# Patient Record
Sex: MALE | Race: WHITE | URBAN - METROPOLITAN AREA
[De-identification: names, ages, dates, MRNs, and addresses within clinical notes are randomized per-mention and may not be internally consistent; named-entity substitution may affect disease eponyms.]

---

## 2017-11-01 ENCOUNTER — TRANSFERRED RECORDS (OUTPATIENT)
Dept: HEALTH INFORMATION MANAGEMENT | Facility: CLINIC | Age: 67
End: 2017-11-01

## 2019-02-22 ENCOUNTER — OFFICE VISIT (OUTPATIENT)
Dept: FAMILY MEDICINE | Facility: CLINIC | Age: 69
End: 2019-02-22
Payer: COMMERCIAL

## 2019-02-22 ENCOUNTER — ANCILLARY PROCEDURE (OUTPATIENT)
Dept: GENERAL RADIOLOGY | Facility: CLINIC | Age: 69
End: 2019-02-22
Attending: NURSE PRACTITIONER
Payer: COMMERCIAL

## 2019-02-22 VITALS
OXYGEN SATURATION: 96 % | SYSTOLIC BLOOD PRESSURE: 136 MMHG | HEART RATE: 86 BPM | HEIGHT: 77 IN | DIASTOLIC BLOOD PRESSURE: 92 MMHG | BODY MASS INDEX: 32 KG/M2 | WEIGHT: 271 LBS | TEMPERATURE: 97.7 F

## 2019-02-22 DIAGNOSIS — M25.532 LEFT WRIST PAIN: Primary | ICD-10-CM

## 2019-02-22 DIAGNOSIS — M25.532 LEFT WRIST PAIN: ICD-10-CM

## 2019-02-22 DIAGNOSIS — S52.612A CLOSED DISPLACED FRACTURE OF STYLOID PROCESS OF LEFT ULNA, INITIAL ENCOUNTER: ICD-10-CM

## 2019-02-22 DIAGNOSIS — S52.502A CLOSED FRACTURE OF DISTAL END OF LEFT RADIUS, UNSPECIFIED FRACTURE MORPHOLOGY, INITIAL ENCOUNTER: ICD-10-CM

## 2019-02-22 PROCEDURE — 99203 OFFICE O/P NEW LOW 30 MIN: CPT | Mod: 25 | Performed by: NURSE PRACTITIONER

## 2019-02-22 PROCEDURE — 73110 X-RAY EXAM OF WRIST: CPT | Mod: LT

## 2019-02-22 PROCEDURE — 29125 APPL SHORT ARM SPLINT STATIC: CPT | Performed by: NURSE PRACTITIONER

## 2019-02-22 RX ORDER — ATORVASTATIN CALCIUM 20 MG/1
20 TABLET, FILM COATED ORAL DAILY
COMMUNITY
Start: 2019-01-13

## 2019-02-22 RX ORDER — LISINOPRIL 10 MG/1
10 TABLET ORAL DAILY
COMMUNITY
Start: 2019-01-10

## 2019-02-22 RX ORDER — SILDENAFIL 100 MG/1
100 TABLET, FILM COATED ORAL PRN
COMMUNITY
Start: 2019-01-10

## 2019-02-22 RX ORDER — OXYCODONE HYDROCHLORIDE 5 MG/1
5 TABLET ORAL EVERY 6 HOURS PRN
Qty: 12 TABLET | Refills: 0 | Status: SHIPPED | OUTPATIENT
Start: 2019-02-22 | End: 2019-02-25

## 2019-02-22 ASSESSMENT — MIFFLIN-ST. JEOR: SCORE: 2126.63

## 2019-02-22 NOTE — PROGRESS NOTES
"HPI      SUBJECTIVE:   Moises Coats is a 68 year old male who presents to clinic today for the following health issues:      Musculoskeletal problem/pain      Duration: Yesterday    Description  Location: Left wrist    Intensity:  severe    Accompanying signs and symptoms: swelling    History  Previous similar problem: no   Previous evaluation:  none    Precipitating or alleviating factors:  Trauma or overuse: YES- Fell playing pickleball  Aggravating factors include: lifting    Therapies tried and outcome: ice      Playing pickleball yesterday and fell backwards, unknown if left arm was outstretched. Did not hit head no LOC  Immediate pain and swelling. Applied ice, ASA, OTC pain reliever with 50% relief.   Limited range of motion and . Normal sensation, no numbness or tingling.  Mostly left handed    No past medical history on file.  No family history on file.  No past surgical history on file.  Social History     Tobacco Use     Smoking status: Never Smoker     Smokeless tobacco: Never Used   Substance Use Topics     Alcohol use: Yes     Comment: 1 drink a week     Current Outpatient Medications   Medication Sig Dispense Refill     lisinopril (PRINIVIL/ZESTRIL) 10 MG tablet Take 10 mg by mouth daily       oxyCODONE (ROXICODONE) 5 MG tablet Take 1 tablet (5 mg) by mouth every 6 hours as needed for pain 12 tablet 0     sildenafil (VIAGRA) 100 MG tablet Take 100 mg by mouth as needed       atorvastatin (LIPITOR) 20 MG tablet Take 20 mg by mouth daily       Allergies   Allergen Reactions     No Known Allergies        Reviewed and updated as needed this visit by clinical staff and provider     ROS  Detailed as above       BP (!) 136/92 (BP Location: Right arm, Patient Position: Sitting, Cuff Size: Adult Large)   Pulse 86   Temp 97.7  F (36.5  C) (Oral)   Ht 1.956 m (6' 5\")   Wt 122.9 kg (271 lb)   SpO2 96%   BMI 32.14 kg/m     Physical Exam   Constitutional: He appears well-developed.   Pulmonary/Chest: " Effort normal.   Musculoskeletal:   Left wrist has decreased range of motion, tenderness, swelling. Swelling present left hand metacarpal region. Pulse present, sensation intact.    Neurological: He is alert.   Skin: Skin is warm and dry.   Psychiatric: He has a normal mood and affect. Judgment normal.       Assessment and Plan:       ICD-10-CM    1. Left wrist pain M25.532 XR Wrist Left G/E 3 Views   2. Closed fracture of distal end of left radius, unspecified fracture morphology, initial encounter S52.502A ORTHOPEDICS ADULT REFERRAL     oxyCODONE (ROXICODONE) 5 MG tablet     APPLY SHORT ARM SPLINT STATIC   3. Closed displaced fracture of styloid process of left ulna, initial encounter S52.612A APPLY SHORT ARM SPLINT STATIC     Xray with fractures. Volar short arm splint placed with orthoglass.   Sent with oxycodoneverett Lomas APRN, CNP  [facility]

## 2019-02-22 NOTE — Clinical Note
Please abstract the following data from this visit with this patient into the appropriate field in Epic:Colonoscopy done on this date: 11/01/2017 (approximately), by this group: [facility]